# Patient Record
Sex: MALE | Race: WHITE
[De-identification: names, ages, dates, MRNs, and addresses within clinical notes are randomized per-mention and may not be internally consistent; named-entity substitution may affect disease eponyms.]

---

## 2017-08-10 ENCOUNTER — HOSPITAL ENCOUNTER (EMERGENCY)
Dept: HOSPITAL 63 - ER | Age: 32
Discharge: HOME | End: 2017-08-10
Payer: COMMERCIAL

## 2017-08-10 VITALS — SYSTOLIC BLOOD PRESSURE: 144 MMHG | DIASTOLIC BLOOD PRESSURE: 78 MMHG

## 2017-08-10 VITALS — WEIGHT: 211 LBS | BODY MASS INDEX: 27.08 KG/M2 | HEIGHT: 74 IN

## 2017-08-10 DIAGNOSIS — T49.3X5A: Primary | ICD-10-CM
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
## 2017-08-10 DIAGNOSIS — T38.0X5A: ICD-10-CM

## 2017-08-10 PROCEDURE — 71020: CPT

## 2017-08-10 PROCEDURE — 93005 ELECTROCARDIOGRAM TRACING: CPT

## 2017-08-10 PROCEDURE — 96374 THER/PROPH/DIAG INJ IV PUSH: CPT

## 2017-08-10 PROCEDURE — 96375 TX/PRO/DX INJ NEW DRUG ADDON: CPT

## 2017-08-10 PROCEDURE — 96372 THER/PROPH/DIAG INJ SC/IM: CPT

## 2017-08-10 PROCEDURE — S0028 INJECTION, FAMOTIDINE, 20 MG: HCPCS

## 2017-08-10 PROCEDURE — 99284 EMERGENCY DEPT VISIT MOD MDM: CPT

## 2017-08-10 NOTE — PHYS DOC
Past History


Past Medical History:  No Pertinent History


Past Surgical History:  No Surgical History





Adult General


Chief Complaint


Chief Complaint:  ALLERGIC REACTION





HPI


HPI





He is a pleasant otherwise healthy 32-year-old male who presents with a 2 day 

history of rash that he believed was contacted dermatitis. He saw his primary 

care physician who placed him on prednisone and topical calamine lotions. Since 

he placed a calamine lotion on his rash and his face the swelling is gotten 

worse and is developed little bit of anterior tightness across his chest. No 

real chest pain rule cough no true change in voice full raspiness. Patient 

denies any fever, runny nose, congestion, ear pain, ear drainage, nausea, 

vomiting, diarrhea, abdominal pain, blood in the stool, blood in his sputum, 

hematuria, UTI symptoms or joint pain. He also denies any history of tick bites

, insect bites or sexually transmitted disease and history. Patient denies any 

headaches, next pain or palmar swollen vomit of the rash. It is described as 

patchy and very itchy on the external extremities. It began primarily on the 

left almonds down the right arm and his left shoulder. He has had no sick 

contacts.





Review of Systems


Review of Systems





Constitutional: Denies fever or chills []


Eyes: Denies change in visual acuity, redness, or eye pain []


HENT: Denies nasal congestion or sore throat []


Respiratory: Denies cough or shortness of breath []


Cardiovascular: No additional information not addressed in HPI []


GI: Denies abdominal pain, nausea, vomiting, bloody stools or diarrhea []


: Denies dysuria or hematuria []


Musculoskeletal: Denies back pain or joint pain []


Integument: Patient has a curette and upper extremities his face and his upper 

shoulder.


Neurologic: Denies headache, focal weakness or sensory changes []


Endocrine: Denies polyuria or polydipsia []





Current Medications


Current Medications





Current Medications








 Medications


  (Trade)  Dose


 Ordered  Sig/Yunior  Start Time


 Stop Time Status Last Admin


Dose Admin


 


 Diphenhydramine


 HCl


  (Benadryl)  50 mg  1X  ONCE  8/10/17 18:45


 8/10/17 18:46   


 


 


 Epinephrine HCl  0.5 mg  1X  ONCE  8/10/17 18:45


 8/10/17 18:46   


 


 


 Famotidine


  (Pepcid)  20 mg  1X  ONCE  8/10/17 18:45


 8/10/17 18:46   


 


 


 Methylprednisolone


 Sodium Succinate


  (SOLU-Medrol


 125MG VIAL)  125 mg  1X  ONCE  8/10/17 18:45


 8/10/17 18:46   


 











Allergies


Allergies





Allergies








Coded Allergies Type Severity Reaction Last Updated Verified


 


  No Known Drug Allergies    8/10/17 No











Physical Exam


Physical Exam


Vital signs been reviewed patient's 151/71


Constitutional: Well developed, well nourished, no acute distress, non-toxic 

appearance. []


HENT: Normocephalic, atraumatic, bilateral external ears normal, oropharynx 

moist, no oral exudates, nose normal. []


Eyes: PERRLA, EOMI, conjunctiva normal, no discharge. [] 


Neck: Normal range of motion, no tenderness, supple, no stridor. [] 


Cardiovascular:Heart rate regular rhythm, no murmur []


Lungs & Thorax:  Bilateral breath sounds clear to auscultation []


Abdomen: Bowel sounds normal, soft, no tenderness, no masses, no pulsatile 

masses. [] 


Skin: She has a slight rash in the upper extremity's bilaterally described as 

fine not papular macular area question of small vesicles. There is no joint 

involvement, there is no joint swelling. Patient has mild coalition of the same 

rash on his left shoulder. There is no obvious linear striations, there is no 

target lesion, there is no pulmonary consolable. His oropharynx is clear with 

no evidence of mucous membrane involvement. Patient's face shows marked 

swelling with the same rash although it's much more consolidated with much more 

erythema and some periorbital edema.


Back: No tenderness, no CVA tenderness. [] 


Extremities: No tenderness, no cyanosis, no clubbing, ROM intact, no edema. [] 


Neurologic: Alert and oriented X 3, normal motor function, normal sensory 

function, no focal deficits noted. []


Psychologic: Affect normal, judgement normal, mood normal. []





EKG


EKG


[]





Radiology/Procedures


Radiology/Procedures


[]





Course & Med Decision Making


Course & Med Decision Making


Pertinent Labs and Imaging studies reviewed. (See chart for details) this 

patient presents with itchy rashes upper extremities and upper left back he's 

been placed on medications that are appropriate for an allergic reaction 

although the calamine lotion a large decubitus symptoms. Patient's EKG timed 6:

40 PM 08/10/2017 demonstrates heart rate of 65 normal sinus rhythm normal QRS 

width of 94 no PA interval 118 QTC of 392. This is a normal EKG according Dr. Topete. Patient's chest x-ray timed 7 PM 08/10/2017 PA and lateral chest x-ray 

completed here no murmurs problems demonstrates normal cardiac shadow no 

mediastinal air, no pleural effusion, no subdiaphragmatic air, no evidence of 

pulmonary infiltrate. The x-rays not quite adequate as it does cause of the 

costophrenic angle on the right but there is no abdomen amount in the scene at 

this point. Read by Dr. Topete.








At this pointPatient tells me that their symptoms given during CC are improved.

  We reviewed labs and radiology reports with patient and any family at 

bedside. 735 patient looks and feels markedly better.





[]





Dragon Disclaimer


Dragon Disclaimer


This chart was dictated in whole or in part using Voice Recognition software in 

a busy, high-work load, and often noisy Emergency Department environment.  It 

may contain unintended and wholly unrecognized errors or omissions.





Departure


Departure:


Impression:  


 Primary Impression:  


 Allergic reaction to chemical substance


Disposition:  01 HOME, SELF-CARE


Condition:  IMPROVED


Referrals:  


GILDA LEE (PCP)


Patient Instructions:  Drug Allergy





Additional Instructions:  


Please return immediately for any difficulty breathing, problems swallowing or 

change in voice. Also encouraged to return medially to develop any fever 

greater than 102.2 despite treatment or if you have any questions or concerns. 

Please use the prednisone taper prescribed by her prior doctor until the 

completion of this treatment course.


Scripts


Famotidine (PEPCID) 20 Mg Tablet


1 TAB PO BID, #20 TAB 3 Refills


   Prov: ADA TOPETE MD         8/10/17 


Diphenhydramine Hcl (BENADRYL) 25 Mg Capsule


50 MG PO QID for 7 Days, #56 CAP


   Prov: ADA TOPETE MD         8/10/17











ADA TOPETE MD Aug 10, 2017 18:41

## 2017-08-11 NOTE — EKG
[facility] [address], [place] [address]

Test Date:    2017-08-10               Test Time:    18:40:51

Pat Name:     DOROTHY ACUNA         Department:   

Patient ID:   [id number]           Room:          

Gender:       M                        Technician:   YOUNG

:          1985               Requested By: ADA FERREIRA

Order Number: [id number]            Reading MD:     

                                 Measurements

Intervals                              Axis          

Rate:         65                       P:            31

TN:           118                      QRS:          52

QRSD:         94                       T:            34

QT:           372                                    

QTc:          392                                    

                           Interpretive Statements

SINUS RHYTHM

NORMAL ECG

RI6.01          Unconfirmed report

No previous ECG available for comparison

## 2017-08-11 NOTE — RAD
PA and lateral chest radiographs 8/10/2017



Clinical History: Allergic reaction with chest tightness. 



PA and lateral digital radiographs of the chest were obtained. No previous

studies are available for comparison. The cardiac and mediastinal silhouettes

are within normal limits in size and configuration. No pulmonary infiltrate is

seen. No pleural effusion or pneumothorax is noted. The osseous structures are

grossly intact.



Impression: No radiographic evidence of active cardiopulmonary disease.

## 2017-11-26 ENCOUNTER — HOSPITAL ENCOUNTER (EMERGENCY)
Dept: HOSPITAL 63 - ER | Age: 32
Discharge: HOME | End: 2017-11-26
Payer: COMMERCIAL

## 2017-11-26 VITALS — HEIGHT: 73 IN | BODY MASS INDEX: 28.4 KG/M2 | WEIGHT: 214.29 LBS

## 2017-11-26 VITALS — SYSTOLIC BLOOD PRESSURE: 132 MMHG | DIASTOLIC BLOOD PRESSURE: 76 MMHG

## 2017-11-26 DIAGNOSIS — K59.00: ICD-10-CM

## 2017-11-26 DIAGNOSIS — R74.0: ICD-10-CM

## 2017-11-26 DIAGNOSIS — R31.9: ICD-10-CM

## 2017-11-26 DIAGNOSIS — E87.6: ICD-10-CM

## 2017-11-26 DIAGNOSIS — N23: Primary | ICD-10-CM

## 2017-11-26 LAB
ALBUMIN SERPL-MCNC: 3.6 G/DL (ref 3.4–5)
ALP SERPL-CCNC: 86 U/L (ref 46–116)
ALT SERPL-CCNC: 71 U/L (ref 16–63)
ANION GAP SERPL CALC-SCNC: 11 MMOL/L (ref 6–14)
APTT PPP: YELLOW S
AST SERPL-CCNC: 50 U/L (ref 15–37)
BACTERIA #/AREA URNS HPF: 0 /HPF
BASOPHILS # BLD AUTO: 0.4 X10^3/UL (ref 0–0.2)
BASOPHILS NFR BLD: 4 % (ref 0–3)
BILIRUB DIRECT SERPL-MCNC: 0.1 MG/DL (ref 0–0.2)
BILIRUB SERPL-MCNC: 0.3 MG/DL (ref 0.2–1)
BILIRUB UR QL STRIP: (no result)
CA-I SERPL ISE-MCNC: 12 MG/DL (ref 8–26)
CALCIUM SERPL-MCNC: 9.4 MG/DL (ref 8.5–10.1)
CHLORIDE SERPL-SCNC: 103 MMOL/L (ref 98–107)
CO2 SERPL-SCNC: 29 MMOL/L (ref 21–32)
CREAT SERPL-MCNC: 1 MG/DL (ref 0.7–1.3)
EOSINOPHIL NFR BLD: 0.1 X10^3/UL (ref 0–0.7)
EOSINOPHIL NFR BLD: 1 % (ref 0–3)
ERYTHROCYTE [DISTWIDTH] IN BLOOD BY AUTOMATED COUNT: 14.1 % (ref 11.5–14.5)
FIBRINOGEN PPP-MCNC: CLEAR MG/DL
GFR SERPLBLD BASED ON 1.73 SQ M-ARVRAT: 85.5 ML/MIN
GLUCOSE SERPL-MCNC: 104 MG/DL (ref 70–99)
GLUCOSE UR STRIP-MCNC: (no result) MG/DL
HCT VFR BLD CALC: 40 % (ref 39–53)
HGB BLD-MCNC: 13.8 G/DL (ref 13–17.5)
LYMPHOCYTES # BLD: 2 X10^3/UL (ref 1–4.8)
LYMPHOCYTES NFR BLD AUTO: 20 % (ref 24–48)
MCH RBC QN AUTO: 29 PG (ref 25–35)
MCHC RBC AUTO-ENTMCNC: 35 G/DL (ref 31–37)
MCV RBC AUTO: 84 FL (ref 79–100)
MONO #: 1.2 X10^3/UL (ref 0–1.1)
MONOCYTES NFR BLD: 13 % (ref 0–9)
NEUT #: 6.1 X10^3UL (ref 1.8–7.7)
NEUTROPHILS NFR BLD AUTO: 62 % (ref 31–73)
NITRITE UR QL STRIP: (no result)
PLATELET # BLD AUTO: 345 X10^3/UL (ref 140–400)
POTASSIUM SERPL-SCNC: 3.1 MMOL/L (ref 3.5–5.1)
PROT SERPL-MCNC: 8 G/DL (ref 6.4–8.2)
RBC # BLD AUTO: 4.74 X10^6/UL (ref 4.3–5.7)
RBC #/AREA URNS HPF: (no result) /HPF (ref 0–2)
SODIUM SERPL-SCNC: 143 MMOL/L (ref 136–145)
SP GR UR STRIP: 1.02
SQUAMOUS #/AREA URNS LPF: (no result) /LPF
UROBILINOGEN UR-MCNC: 0.2 MG/DL
WBC # BLD AUTO: 9.9 X10^3/UL (ref 4–11)
WBC #/AREA URNS HPF: (no result) /HPF (ref 0–4)

## 2017-11-26 PROCEDURE — 81001 URINALYSIS AUTO W/SCOPE: CPT

## 2017-11-26 PROCEDURE — 74022 RADEX COMPL AQT ABD SERIES: CPT

## 2017-11-26 PROCEDURE — 85025 COMPLETE CBC W/AUTO DIFF WBC: CPT

## 2017-11-26 PROCEDURE — 80048 BASIC METABOLIC PNL TOTAL CA: CPT

## 2017-11-26 PROCEDURE — 85730 THROMBOPLASTIN TIME PARTIAL: CPT

## 2017-11-26 PROCEDURE — 96361 HYDRATE IV INFUSION ADD-ON: CPT

## 2017-11-26 PROCEDURE — 96374 THER/PROPH/DIAG INJ IV PUSH: CPT

## 2017-11-26 PROCEDURE — 96375 TX/PRO/DX INJ NEW DRUG ADDON: CPT

## 2017-11-26 PROCEDURE — 74176 CT ABD & PELVIS W/O CONTRAST: CPT

## 2017-11-26 PROCEDURE — 99285 EMERGENCY DEPT VISIT HI MDM: CPT

## 2017-11-26 PROCEDURE — 80076 HEPATIC FUNCTION PANEL: CPT

## 2017-11-26 PROCEDURE — 36415 COLL VENOUS BLD VENIPUNCTURE: CPT

## 2017-11-26 PROCEDURE — 85610 PROTHROMBIN TIME: CPT

## 2017-11-26 NOTE — RAD
EXAM: Abdomen and pelvis CT without intravenous contrast.

 

HISTORY: Left flank pain.

 

TECHNIQUE: Computed tomographic images of the abdomen and pelvis were 

obtained without contrast. Multiplanar reformatting was performed. 

*One or more of the following individualized dose reduction techniques 

were utilized for this examination:  

1. Automated exposure control.  

2. Adjustment of the mA and/or kV according to patient size.  

3. Use of iterative reconstruction technique.

 

COMPARISON: None.

 

FINDINGS: Evaluation of the lower thorax demonstrates no infiltrate or 

effusion. No hepatic lesion is seen. The gallbladder, pancreas, spleen and

adrenal glands are unremarkable. There is no evidence of nephrolithiasis 

or obstructive uropathy. The bladder is decompressed.

 

The appendix is normal in appearance. No abnormally thickened or dilated 

loop of bowel is seen. There is trace nonspecific stranding within the 

root of the mesentery. This is not within limits to suggest mesenteric 

panniculitis. No pathologically enlarged lymph node is seen. There is no 

suspicious osseous lesion. There are few benign bone islands.

 

IMPRESSION: No acute abdominal or pelvic finding. Specifically, no 

evidence of nephrolithiasis or obstructive uropathy.

 

Electronically signed by: Christine Tello MD (11/26/2017 9:25 PM) [id number]

## 2017-11-26 NOTE — ED.ADGEN
Past History


Past Medical History:  No Pertinent History


Past Surgical History:  No Surgical History


Alcohol Use:  Rarely


Drug Use:  None





Adult General


Chief Complaint


Chief Complaint


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 but I got this severe back 

pain.. manly on the Rt... it radiates to my groin.."  "  I comes and goes"





HPI


HPI





Patient is a 32 year old male who presents with above hx and complaints of Rt 

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 Patient denies 

previous history kidney stones. Patient denies any trauma. Patient has right 

flank pain on percussion. No family history kidney stones. No history of bad 

food. No history of ill contacts. No history of problems with defecation or 

urination. No history of fevers. No history of immunosuppression.





Review of Systems


Review of Systems





Constitutional: Denies fever or chills []


Eyes: Denies change in visual acuity, redness, or eye pain []


HENT: Denies nasal congestion or sore throat []


Respiratory: Denies cough or shortness of breath []


Cardiovascular: No additional information not addressed in HPI []


GI: Denies abdominal pain, nausea, vomiting, bloody stools or diarrhea []


: Denies dysuria or hematuria []


Musculoskeletal: Plaints of right flank back pain and joint pain []


Integument: Denies rash or skin lesions []


Neurologic: Denies headache, focal weakness or sensory changes []


Endocrine: Denies polyuria or polydipsia []





All other systems were reviewed and found to be within normal limits, except as 

documented in this note.





Family History


Family History


Noncontributory





Current Medications


Current Medications





Current Medications








 Medications


  (Trade)  Dose


 Ordered  Sig/Yunior  Start Time


 Stop Time Status Last Admin


Dose Admin


 


 Ketorolac


 Tromethamine


  (Toradol)  30 mg  1X  ONCE  11/26/17 21:30


 11/26/17 21:31 DC 11/26/17 21:23


30 MG


 


 Lactated Ringer's  1,000 ml @ 


 1,000 mls/hr  1X  ONCE  11/26/17 21:30


 11/26/17 22:29 DC 11/26/17 21:23


1,000 MLS/HR


 


 Magnesium


 Hydroxide


  (Milk Of


 Magnesia)  2,400 mg  1X  ONCE  11/26/17 22:00


 11/26/17 22:01 DC 11/26/17 22:00


2,400 MG


 


 Orphenadrine


 Citrate


  (Norflex)  60 mg  1X  ONCE  11/26/17 21:30


 11/26/17 21:31 DC 11/26/17 21:23


60 MG


 


 Potassium Chloride


  (KCl Oral Soln)  40 meq  1X  ONCE  11/26/17 23:00


 11/26/17 23:01 DC  


 





See nursing for home meds





Allergies


Allergies





Allergies








Coded Allergies Type Severity Reaction Last Updated Verified


 


  No Known Drug Allergies    8/10/17 No











Physical Exam


Physical Exam





Constitutional: Well developed, well nourished,in acute distress, non-toxic 

appearance. []


HENT: Normocephalic, atraumatic, bilateral external ears normal, oropharynx 

moist, no oral exudates, nose normal. []


Eyes: PERRLA, EOMI, conjunctiva normal, no discharge. [] 


Neck: Normal range of motion, no tenderness, supple, no stridor. [] 


Cardiovascular:Heart rate regular rhythm, no murmur []


Lungs & Thorax:  Bilateral breath sounds clear to auscultation []


Abdomen: Bowel sounds decreased, soft, no tenderness, no masses, no pulsatile 

masses. Mildly distended abdomen. Right flank pain on percussion.  No penile 

discharge.


Skin: Warm, dry, no erythema, no rash. [] 


Back: No tenderness, no CVA tenderness. [] 


Extremities: No tenderness, no cyanosis, no clubbing, ROM intact, no edema. No 

psoas or obturator sign


Neurologic: Alert and oriented X 3, normal motor function, normal sensory 

function, no focal deficits noted. []


Psychologic: Affect anxious, judgement normal, mood normal. []





Current Patient Data


Vital Signs





 Vital Signs








  Date Time  Temp Pulse Resp B/P (MAP) Pulse Ox O2 Delivery O2 Flow Rate FiO2


 


11/26/17 20:45 98.3 74 20  97 Room Air  








Lab Results





 Laboratory Tests








Test


  11/26/17


20:45 11/26/17


21:30


 


Urine Collection Type Unknown   


 


Urine Color Yellow   


 


Urine Clarity Clear   


 


Urine pH 5.5   


 


Urine Specific Gravity 1.020   


 


Urine Protein


  100 mg/dl


(NEG-TRACE) 


 


 


Urine Glucose (UA)


  Neg mg/dL


(NEG) 


 


 


Urine Ketones (Stick)


  Neg mg/dL


(NEG) 


 


 


Urine Blood Mod (NEG)   


 


Urine Nitrite Neg (NEG)   


 


Urine Bilirubin Neg (NEG)   


 


Urine Urobilinogen Dipstick


  0.2 mg/dL (0.2


mg/dL) 


 


 


Urine Leukocyte Esterase Neg (NEG)   


 


Urine RBC


  3-5 /HPF (0-2)


  


 


 


Urine WBC


  1-4 /HPF (0-4)


  


 


 


Urine Squamous Epithelial


Cells Occ /LPF  


  


 


 


Urine Bacteria


  0 /HPF (0-FEW)


  


 


 


Urine Mucus Mod /LPF   


 


White Blood Count


  


  9.9 x10^3/uL


(4.0-11.0)


 


Red Blood Count


  


  4.74 x10^6/uL


(4.30-5.70)


 


Hemoglobin


  


  13.8 g/dL


(13.0-17.5)


 


Hematocrit


  


  40.0 %


(39.0-53.0)


 


Mean Corpuscular Volume


  


  84 fL ()


 


 


Mean Corpuscular Hemoglobin  29 pg (25-35)  


 


Mean Corpuscular Hemoglobin


Concent 


  35 g/dL


(31-37)


 


Red Cell Distribution Width


  


  14.1 %


(11.5-14.5)


 


Platelet Count


  


  345 x10^3/uL


(140-400)


 


Neutrophils (%) (Auto)  62 % (31-73)  


 


Lymphocytes (%) (Auto)  20 % (24-48)  L


 


Monocytes (%) (Auto)  13 % (0-9)  H


 


Eosinophils (%) (Auto)  1 % (0-3)  


 


Basophils (%) (Auto)  4 % (0-3)  H


 


Neutrophils # (Auto)


  


  6.1 x10^3uL


(1.8-7.7)


 


Lymphocytes # (Auto)


  


  2.0 x10^3/uL


(1.0-4.8)


 


Monocytes # (Auto)


  


  1.2 x10^3/uL


(0.0-1.1)  H


 


Eosinophils # (Auto)


  


  0.1 x10^3/uL


(0.0-0.7)


 


Basophils # (Auto)


  


  0.4 x10^3/uL


(0.0-0.2)  H


 


Prothrombin Time


  


  10.3 SEC


(9.4-11.4)


 


Prothrombin Time INR  1.0 (0.9-1.1)  


 


PTT


  


  25 SEC (23-33)


 


 


Sodium Level


  


  143 mmol/L


(136-145)


 


Potassium Level


  


  3.1 mmol/L


(3.5-5.1)  L


 


Chloride Level


  


  103 mmol/L


()


 


Carbon Dioxide Level


  


  29 mmol/L


(21-32)


 


Anion Gap  11 (6-14)  


 


Blood Urea Nitrogen


  


  12 mg/dL


(8-26)


 


Creatinine


  


  1.0 mg/dL


(0.7-1.3)


 


Estimated GFR


(Cockcroft-Gault) 


  85.5  


 


 


Glucose Level


  


  104 mg/dL


(70-99)  H


 


Calcium Level


  


  9.4 mg/dL


(8.5-10.1)


 


Total Bilirubin


  


  0.3 mg/dL


(0.2-1.0)


 


Direct Bilirubin


  


  0.1 mg/dL


(0.0-0.2)


 


Aspartate Amino Transferase


(AST) 


  50 U/L (15-37)


H


 


Alanine Aminotransferase (ALT)


  


  71 U/L (16-63)


H


 


Alkaline Phosphatase


  


  86 U/L


()


 


Total Protein


  


  8.0 g/dL


(6.4-8.2)


 


Albumin


  


  3.6 g/dL


(3.4-5.0)











EKG


EKG


[]





Radiology/Procedures


Radiology/Procedures


My interpretation of Acute Abd-  no free air under diaphragm.  Increased stool 

throughout the colon. Non-obstructive bowel gas pattern.


CT of abdomen shows no hydronephrosis or findings acute appendicitis.[]





Course & Med Decision Making


Course & Med Decision Making


Pertinent Labs and Imaging studies reviewed. (See chart for details)





Clear fluid diet.  Follow up urine cultures.   Follow up primary.  Take 

Ibuprofen and tylenol fo pain.   Push fluids. Must follow-up.  Return if any 

concerns.  Push fruit juices.





[]





Final Impression


Final Impression


1.  Back Pain


2.  Constipation


3.  Hematuria


4.  Renal Colic


5.  Elevated AST/ALT


6.  Hypokalemia


Problems:  





Dragon Disclaimer


Dragon Disclaimer


This electronic medical record was generated, in whole or in part, using a 

voice recognition dictation system.











KIMI HOLM MD Nov 26, 2017 20:34

## 2017-11-27 NOTE — RAD
ACUTE ABDOMEN SERIES



Clinical Indication: Abdominal pain



Comparison: Chest radiographs dated 8/10/2017



Findings: 

Normal lung volume. No focal consolidation. Normal pulmonary vasculature. No

pleural effusion or pneumothorax. The cardiomediastinal silhouette and great

vessels are normal.



Nonobstructive bowel gas pattern. Moderate amount of colonic stool. No obvious

free air. No abnormal calcifications.



No acute osseous abnormality.



IMPRESSION:



1. Nonobstructive bowel gas pattern. Moderate colonic stool.

2. No acute osseous abnormality.

## 2017-11-30 ENCOUNTER — HOSPITAL ENCOUNTER (EMERGENCY)
Dept: HOSPITAL 63 - ER | Age: 32
Discharge: HOME | End: 2017-11-30
Payer: COMMERCIAL

## 2017-11-30 VITALS
BODY MASS INDEX: 28.4 KG/M2 | WEIGHT: 214.29 LBS | SYSTOLIC BLOOD PRESSURE: 158 MMHG | HEIGHT: 73 IN | DIASTOLIC BLOOD PRESSURE: 80 MMHG

## 2017-11-30 DIAGNOSIS — R10.33: Primary | ICD-10-CM

## 2017-11-30 DIAGNOSIS — R31.9: ICD-10-CM

## 2017-11-30 LAB
APTT PPP: YELLOW S
BACTERIA #/AREA URNS HPF: 0 /HPF
BILIRUB UR QL STRIP: (no result)
FIBRINOGEN PPP-MCNC: CLEAR MG/DL
GLUCOSE UR STRIP-MCNC: (no result) MG/DL
NITRITE UR QL STRIP: (no result)
RBC #/AREA URNS HPF: (no result) /HPF (ref 0–2)
SP GR UR STRIP: 1.01
SQUAMOUS #/AREA URNS LPF: (no result) /LPF
UROBILINOGEN UR-MCNC: 0.2 MG/DL
WBC #/AREA URNS HPF: (no result) /HPF (ref 0–4)

## 2017-11-30 PROCEDURE — 99284 EMERGENCY DEPT VISIT MOD MDM: CPT

## 2017-11-30 PROCEDURE — 81001 URINALYSIS AUTO W/SCOPE: CPT

## 2017-11-30 NOTE — PHYS DOC
General


Chief Complaint:  ABDOMINAL PAIN


Stated Complaint:  SHARP PAIN IN ABDOMEN


Time Seen by MD:  20:09


Source:  patient, old records


Exam Limitations:  no limitations


Problems:  





History of Present Illness


Initial Comments


Pt is 32/M to ED c/o "the same abdominal pain I had the other day."





Pt was seen here 4 days ago, records indicate he'd been putting up [place] 

lights and had right flank pain.  Pt states to me he had pain to the left of 

his umbilicus occasionally radiating to his flank/groin.  Denies n/v/d, 

appetite intact, no fever/chills/myalgias, no travel/bad food exposure/

immunosuppression and voiding/BMs "normal."  States his symptoms have remained 

unchanged/intermittent, no new symptoms or progression of those for which he 

was seen previously.  Pt was extensively evaluated in the ED with labs/urine/

plain films/CT, aside from K+ 3.1, AST 50, ALT 11, moderate blood on urinalysis 

his labs were unremarkable.  I have copied his imaging studies to the chart 

below.





Pt says he followed up with his PCP earlier today and was told that the ED didn'

t do the right imaging studies to rule out kidney stones.  States he was told 

to return here if his symptoms did not improve to rule out kidney stones.  Pt 

is here with his significant other, she expresses frustration bordering on 

anger at this ED feeling the correct testing was not completed.


ED VS:  97.9, 67, 18, 158/80, 98% RA





When I ask pt to point to his pain, he points to few centimeters to the left 

and slightly inferior to his umbilicus.  Rates it 8/10, sharp/stabby, no 

worsening/relieving factors and no relation to PO intake.  








PATIENT: DOROTHY ACUNA ACCOUNT: [id number] MRN#: [id number]


: 1985 LOCATION: ER AGE: 32


SEX: M EXAM DT: 17 ACCESSION#: [id number]


STATUS: DEP ER ORD. PHYSICIAN: KIMI HOLM MD 


REASON: pain


PROCEDURE: ACUTE ABDOMEN SERIES








ACUTE ABDOMEN SERIES





Clinical Indication: Abdominal pain





Comparison: Chest radiographs dated 8/10/2017





Findings: 


Normal lung volume. No focal consolidation. Normal pulmonary vasculature. No


pleural effusion or pneumothorax. The cardiomediastinal silhouette and great


vessels are normal.





Nonobstructive bowel gas pattern. Moderate amount of colonic stool. No obvious


free air. No abnormal calcifications.





No acute osseous abnormality.





IMPRESSION:





1. Nonobstructive bowel gas pattern. Moderate colonic stool.


2. No acute osseous abnormality.














DICTATED AND SIGNED BY:     YEIMY MORALES MD


DATE:     17





CC: GILDA LEE; KIMI HOLM MD ~











PATIENT: DOROTHY ACUNA ACCOUNT: [id number] MRN#: [id number]


: 1985 LOCATION: ER AGE: 32


SEX: M EXAM DT: 17 ACCESSION#: [id number]


STATUS: REG ER ORD. PHYSICIAN: KIMI HOLM MD 


REASON: Abdominal pain, flank pank, eval renal stone


PROCEDURE: CT ABDOMEN PELVIS WO CONTRAST





 


EXAM: Abdomen and pelvis CT without intravenous contrast.


 


HISTORY: Left flank pain.


 


TECHNIQUE: Computed tomographic images of the abdomen and pelvis were 


obtained without contrast. Multiplanar reformatting was performed. 


*One or more of the following individualized dose reduction techniques 


were utilized for this examination:  


1. Automated exposure control.  


2. Adjustment of the mA and/or kV according to patient size.  


3. Use of iterative reconstruction technique.


 


COMPARISON: None.


 


FINDINGS: Evaluation of the lower thorax demonstrates no infiltrate or 


effusion. No hepatic lesion is seen. The gallbladder, pancreas, spleen and


adrenal glands are unremarkable. There is no evidence of nephrolithiasis 


or obstructive uropathy. The bladder is decompressed.


 


The appendix is normal in appearance. No abnormally thickened or dilated 


loop of bowel is seen. There is trace nonspecific stranding within the 


root of the mesentery. This is not within limits to suggest mesenteric 


panniculitis. No pathologically enlarged lymph node is seen. There is no 


suspicious osseous lesion. There are few benign bone islands.


 


IMPRESSION: No acute abdominal or pelvic finding. Specifically, no 


evidence of nephrolithiasis or obstructive uropathy.


 


Electronically signed by: Christine Price MD (2017 9:25 PM) [facility]














DICTATED AND SIGNED BY:     CHRISTINE PRICE MD


DATE:     17





CC: GILDA LEE; KIMI HOLM MD ~


Timing/Duration:  other


Severity:  severe


Modifying Factors:  improves with other


Associated Symptoms:  other


Allergies:  


Coded Allergies:  


     No Known Drug Allergies (Unverified , 8/10/17)





Past Medical History


Medical History:  no pertinent history


Surgical History:  no surgical history





Social History


Smoker:  non-smoker


Alcohol:  occasionally


Drugs:  none





Review of Systems


Constitutional:  denies chills, denies diaphoresis, denies fever, denies malaise


EENTM:  denies ear pain, denies nose congestion, denies throat pain, denies 

throat swelling, denies mouth pain


Respiratory:  denies cough, denies shortness of breath


Cardiovascular:  denies chest pain, denies palpitations


Gastrointestinal:  see HPI, denies diarrhea, denies nausea, denies vomiting


Genitourinary:  denies dysuria, denies frequency, denies hematuria, denies pain


Musculoskeletal:  denies joint swelling, denies muscle pain, denies neck pain


Psychiatric/Neurological:  denies headache, denies numbness, denies paresthesia

, denies weakness


Hematologic/Lymphatic:  denies blood clots, denies easy bleeding, denies easy 

bruising





Physical Exam


General Appearance:  WD/WN, no apparent distress


Eyes:  bilateral eye normal inspection, bilateral eye PERRL, bilateral eye EOMI


Ear, Nose, Throat:  hearing grossly normal, normal ENT inspection, normal 

pharynx


Neck:  non-tender, supple


Respiratory:  normal breath sounds, no respiratory distress


Cardiovascular:  normal peripheral pulses, regular rate, rhythm


Gastrointestinal:  normal bowel sounds, non tender, soft, no organomegaly, no 

pulsatile mass, other (normal exam)


Rectal:  deferred


Back:  no CVA tenderness, no vertebral tenderness


Extremities:  non-tender, normal inspection


Neurologic/Psychiatric:  CNs II-XII nml as tested, no motor/sensory deficits, 

alert, normal mood/affect, oriented x 3


Skin:  normal color, warm/dry





Orders, Labs, Meds


I requested staff print copies of imaging studies done last visit and reviewed 

them extensively with pt and his SO.  Specifically I reviewed the CT results 

which noted no neprolithiasis, no ureterolithiasis or obstructive uropathy.  I 

surmised with them that their PCP likely only had copy of abdominal series 

result, as CT would have been diagnostic for kidney stones.  I gave them the 

paper copies to take back to PCP.


After extensive discussion and teaching, going over studies from last visit and 

today's urinalysis I did offer to repeat the studies.  However I advised that 

no stones were present in the kidneys and it was unlikely that pt had developed 

any in the past 4 days.  As pt's symptoms hadn't changed, and after hearing 

that stones had actually been definitively ruled out pt did say he was feeling 

better and just wanted to go home.  


I discussed s/s to monitor and indications for return, OTC and rx meds, and PCP 

follow up.  Both of their questions were answered to their satisfaction and 

they expressed agreement/understanding of treatment plan.  Agreed to follow up 

with Dr Lee.


Departure


Time of Disposition:  21:58


Disposition:  01 HOME, SELF-CARE


Diagnosis:  abdominal pain, hematuria


Condition:  GOOD


Patient Instructions:  Abdominal Pain (Nonspecific), Hematuria, Adult





Additional Instructions:  


As discussed no apparent cause for your discomfort was evident from tonight's 

emergency department visit.


At this point you have declined any further evaluation or treatment and 

understanding can return at any time.


Prescription: Norco 5 mg quantity 15


Take medication with food. 


Drink extra fluids and take over-the-counter stool softeners to prevent 

constipation.


Follow-up with your doctor as scheduled for recheck, recommend outpatient 

urology evaluation for hematuria.


Return to ED with new or changing symptoms.











SHERI DAVID DO 2017 22:00

## 2021-04-05 ENCOUNTER — HOSPITAL ENCOUNTER (EMERGENCY)
Dept: HOSPITAL 63 - ER | Age: 36
Discharge: HOME | End: 2021-04-05
Payer: COMMERCIAL

## 2021-04-05 VITALS — SYSTOLIC BLOOD PRESSURE: 135 MMHG | DIASTOLIC BLOOD PRESSURE: 66 MMHG

## 2021-04-05 VITALS — BODY MASS INDEX: 30.68 KG/M2 | WEIGHT: 231.49 LBS | HEIGHT: 73 IN
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
## 2021-04-05 DIAGNOSIS — R55: ICD-10-CM

## 2021-04-05 DIAGNOSIS — S09.93XA: ICD-10-CM

## 2021-04-05 DIAGNOSIS — Z88.8: ICD-10-CM

## 2021-04-05 DIAGNOSIS — S02.5XXA: Primary | ICD-10-CM
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
## 2021-04-05 LAB
ALBUMIN SERPL-MCNC: 3.7 G/DL (ref 3.4–5)
ALBUMIN/GLOB SERPL: 0.9 {RATIO} (ref 1–1.7)
ALP SERPL-CCNC: 88 U/L (ref 46–116)
ALT SERPL-CCNC: 57 U/L (ref 16–63)
ANION GAP SERPL CALC-SCNC: 12 MMOL/L (ref 6–14)
AST SERPL-CCNC: 55 U/L (ref 15–37)
BASOPHILS # BLD AUTO: 0 X10^3/UL (ref 0–0.2)
BASOPHILS NFR BLD: 1 % (ref 0–3)
BILIRUB SERPL-MCNC: 0.4 MG/DL (ref 0.2–1)
BUN/CREAT SERPL: 10 (ref 6–20)
CA-I SERPL ISE-MCNC: 12 MG/DL (ref 8–26)
CALCIUM SERPL-MCNC: 9.1 MG/DL (ref 8.5–10.1)
CHLORIDE SERPL-SCNC: 100 MMOL/L (ref 98–107)
CO2 SERPL-SCNC: 27 MMOL/L (ref 21–32)
CREAT SERPL-MCNC: 1.2 MG/DL (ref 0.7–1.3)
EOSINOPHIL NFR BLD: 0 % (ref 0–3)
EOSINOPHIL NFR BLD: 0 X10^3/UL (ref 0–0.7)
ERYTHROCYTE [DISTWIDTH] IN BLOOD BY AUTOMATED COUNT: 15.7 % (ref 11.5–14.5)
GFR SERPLBLD BASED ON 1.73 SQ M-ARVRAT: 67.8 ML/MIN
GLOBULIN SER-MCNC: 4.3 G/DL (ref 2.2–3.8)
GLUCOSE SERPL-MCNC: 102 MG/DL (ref 70–99)
HCT VFR BLD CALC: 41.8 % (ref 39–53)
HGB BLD-MCNC: 14 G/DL (ref 13–17.5)
LYMPHOCYTES # BLD: 0.6 X10^3/UL (ref 1–4.8)
LYMPHOCYTES NFR BLD AUTO: 15 % (ref 24–48)
MCH RBC QN AUTO: 26 PG (ref 25–35)
MCHC RBC AUTO-ENTMCNC: 33 G/DL (ref 31–37)
MCV RBC AUTO: 79 FL (ref 79–100)
MONO #: 0.4 X10^3/UL (ref 0–1.1)
MONOCYTES NFR BLD: 9 % (ref 0–9)
NEUT #: 3.2 X10^3UL (ref 1.8–7.7)
NEUTROPHILS NFR BLD AUTO: 75 % (ref 31–73)
PLATELET # BLD AUTO: 258 X10^3/UL (ref 140–400)
POTASSIUM SERPL-SCNC: 3.7 MMOL/L (ref 3.5–5.1)
PROT SERPL-MCNC: 8 G/DL (ref 6.4–8.2)
RBC # BLD AUTO: 5.32 X10^6/UL (ref 4.3–5.7)
SODIUM SERPL-SCNC: 139 MMOL/L (ref 136–145)
WBC # BLD AUTO: 4.2 X10^3/UL (ref 4–11)

## 2021-04-05 PROCEDURE — 99285 EMERGENCY DEPT VISIT HI MDM: CPT

## 2021-04-05 PROCEDURE — 93005 ELECTROCARDIOGRAM TRACING: CPT

## 2021-04-05 PROCEDURE — 80053 COMPREHEN METABOLIC PANEL: CPT

## 2021-04-05 PROCEDURE — 84484 ASSAY OF TROPONIN QUANT: CPT

## 2021-04-05 PROCEDURE — 96360 HYDRATION IV INFUSION INIT: CPT

## 2021-04-05 PROCEDURE — 85025 COMPLETE CBC W/AUTO DIFF WBC: CPT

## 2021-04-05 PROCEDURE — 70486 CT MAXILLOFACIAL W/O DYE: CPT

## 2021-04-05 PROCEDURE — 70450 CT HEAD/BRAIN W/O DYE: CPT

## 2021-04-05 PROCEDURE — 36415 COLL VENOUS BLD VENIPUNCTURE: CPT

## 2021-04-05 NOTE — RAD
RS Compliance Statement:



One or more of the following individualized dose reduction techniques were utilized for this examinat
ion:  

1. Automated exposure control  

2. Adjustment of the mA and/or kV according to patient size  

3. Use of iterative reconstruction technique





CT HEAD AND MAXILLOFACIAL WITHOUT CONTRAST



History: Reason: syncope, hit face, FEVER 



Comparison: None.



Procedure: Axial images are obtained of the head from the skull base through the vertex without IV co
ntrast. Helical CT imaging of the facial bones is performed without IV contrast.



Findings: 

The ventricles and sulci are normal for the patient's age. 

No mass-effect, midline shift, hemorrhage or obvious acute infarction is identified.  Basilar cistern
s are patent.  



Bone windows demonstrate no significant calvarial abnormality. 



No acute facial bone fracture. The globes and orbits are intact. Mild mucosal thickening inferiorly o
f the bilateral maxillary sinuses. There is no air-fluid level.

Mastoid air cells are well aerated. 



IMPRESSION:

1.  No acute intracranial abnormality.  

2.  No acute facial bone fracture.



Electronically signed by: Schuyler Rosen MD (4/5/2021 10:06 AM) [id number]

## 2021-04-05 NOTE — PHYS DOC
Past History


Past Medical History:  No Pertinent History


Past Surgical History:  No Surgical History


Alcohol Use:  Occasionally


Drug Use:  None





Adult General


Chief Complaint


Chief Complaint:  SYNCOPE





HPI


HPI


Patient is a 36-year-old male who presents to the emergency room after having a 

syncopal episode.  Patient states over the last couple of days he has had a 

fever and has generally felt well.  He woke up this morning and felt back to 

normal.  He thought he was in to be able to go back to work today.  He went to 

the bathroom and then had a syncopal episode.  He is unsure if he hit his face 

on the bathtub or the sink but he woke up on the ground covered in blood.  He is

complaining of nose and mouth pain.  He had been having headaches but no longer 

has a headache.  He denies any shortness of breath, chest pain, nausea, 

vomiting, abdominal pain.





Review of Systems


Review of Systems


Complete ROS is negative unless otherwise documented in HPI





Allergies


Allergies





Allergies








Coded Allergies Type Severity Reaction Last Updated Verified


 


  calamine Allergy Intermediate Swelling/rash 6/25/18 Yes











Physical Exam


Physical Exam


General: Awake, alert, NAD. Well Nourished, well hydrated. Cooperative


HEENT: EOMI, PERRL, airway patent, moist oral mucosa, no nasal septal hematoma,,

dried blood in nares no facial crepitus, deformity to the nose, 2 7 broken at 

the gum with wire sticking out, fractures through tooth 8 and 9, dried blood in 

the mouth


Neck: Supple, trachea midline,[no c-spine tenderness]


Respiratory: CTA bilaterally, normal effort, no wheezing/crackles, no crepitus


CV: RRR, no murmur, cap refill <2, 2+ bilateral radial/DP pulses


GI: Soft, nondistended, nontender, no masses


MSK: [No obvious deformities], pelvis stable and nontender


Skin: Warm, dry


Neuro: A&O x3, speech NL, sensory and motor grossly intact, no focal deficits


Psych: Normal affect, normal mood, not suicidal or homicidal





Current Patient Data


Vital Signs





                                   Vital Signs








  Date Time  Temp Pulse Resp B/P (MAP) Pulse Ox O2 Delivery O2 Flow Rate FiO2


 


4/5/21 08:42 98.9 77 16 135/66 (89) 99 Room Air  








Lab Results





                                Laboratory Tests








Test


 4/5/21


08:53


 


White Blood Count


 4.2 x10^3/uL


(4.0-11.0)


 


Red Blood Count


 5.32 x10^6/uL


(4.30-5.70)


 


Hemoglobin


 14.0 g/dL


(13.0-17.5)


 


Hematocrit


 41.8 %


(39.0-53.0)


 


Mean Corpuscular Volume


 79 fL ()





 


Mean Corpuscular Hemoglobin 26 pg (25-35)  


 


Mean Corpuscular Hemoglobin


Concent 33 g/dL


(31-37)


 


Red Cell Distribution Width


 15.7 %


(11.5-14.5)  H


 


Platelet Count


 258 x10^3/uL


(140-400)


 


Neutrophils (%) (Auto) 75 % (31-73)  H


 


Lymphocytes (%) (Auto) 15 % (24-48)  L


 


Monocytes (%) (Auto) 9 % (0-9)  


 


Eosinophils (%) (Auto) 0 % (0-3)  


 


Basophils (%) (Auto) 1 % (0-3)  


 


Neutrophils # (Auto)


 3.2 x10^3uL


(1.8-7.7)


 


Lymphocytes # (Auto)


 0.6 x10^3/uL


(1.0-4.8)  L


 


Monocytes # (Auto)


 0.4 x10^3/uL


(0.0-1.1)


 


Eosinophils # (Auto)


 0.0 x10^3/uL


(0.0-0.7)


 


Basophils # (Auto)


 0.0 x10^3/uL


(0.0-0.2)











EKG


EKG


[]





Radiology/Procedures


Radiology/Procedures


[]





Heart Score


C/O Chest Pain:  N/A


Risk Factors:


Risk Factors:  DM, Current or recent (<one month) smoker, HTN, HLP, family 

history of CAD, obesity.


Risk Scores:


Risk Factors:  DM, Current or recent (<one month) smoker, HTN, HLP, family 

history of CAD, obesity.





Course & Med Decision Making


Course & Med Decision Making


Pertinent Labs and Imaging studies reviewed. (See chart for details)





Patient is a 36-year-old male who presents to the emergency room complaining of 

facial trauma after syncope.  Patient has been ill the last couple of days but 

was feeling better today.  He has not had much to eat or drink over the last 72 

hours.  He has had a syncopal episode previously but this was after getting 

blood.  Lab work was ordered to evaluate for causes of syncope.  EKG shows 

normal sinus rhythm.  No signs of arrhythmia or ST elevation.  Patient has 

significant dental trauma and a deviated nasal's septum.  CT will be ordered of 

the face and head to evaluate for injuries.  Tetanus will be updated.  Patient 

will need to see a dentist in the next 24 hours.  Patient has an appointment 

with the dentist tomorrow morning at 9 AM.  Will give him a referral to ENT.  

Patient's test results and vitals while in the ED were fully reviewed and 

discussed with the patient. Patient is stable and at this time does not need 

admission to the hospital. We have discussed strict return precautions and the 

importance of following up with their Primary Care Physician. Patient stated 

understanding and was given an opportunity to ask any questions. Patient is in 

agreement with plan.





Dragon Disclaimer


Dragon Disclaimer


This electronic medical record was generated, in whole or in part, using a voice

 recognition dictation system.





Departure


Departure:


Impression:  


   Primary Impression:  


   Facial trauma


   Additional Impressions:  


   Syncope


   Broken teeth


Disposition:  01 DC HOME SELF CARE/HOMELESS


Condition:  STABLE


Referrals:  


GILDA LEE (PCP)


Patient Instructions:  Syncope


Scripts


Clindamycin Hcl (CLINDAMYCIN HCL) 300 Mg Capsule


1 CAP PO TID for broken teeth, #21 CAP


   Prov: ARNULFO SHERMAN MD         4/5/21





Problem Qualifiers











ARNULFO SHERMAN MD             Apr 5, 2021 09:41

## 2021-04-06 NOTE — EKG
[facility] [address], [place] [address]

Test Date:    2021               Test Time:    08:59:05

Pat Name:     DOROTHY ACUNA         Department:   

Patient ID:   [id number]           Room:          

Gender:       M                        Technician:   WALT

:          1985               Requested By: ARNULFO SHERMAN

Order Number: [id number]            Reading MD:     

                                 Measurements

Intervals                              Axis          

Rate:         70                       P:            25

SC:           126                      QRS:          57

QRSD:         86                       T:            31

QT:           362                                    

QTc:          393                                    

                           Interpretive Statements

SINUS RHYTHM

NORMAL ECG

RI6.02

No previous ECG available for comparison